# Patient Record
Sex: MALE | Race: OTHER | NOT HISPANIC OR LATINO
[De-identification: names, ages, dates, MRNs, and addresses within clinical notes are randomized per-mention and may not be internally consistent; named-entity substitution may affect disease eponyms.]

---

## 2017-12-11 ENCOUNTER — APPOINTMENT (OUTPATIENT)
Dept: UROLOGY | Facility: CLINIC | Age: 62
End: 2017-12-11
Payer: COMMERCIAL

## 2017-12-11 VITALS
HEART RATE: 78 BPM | SYSTOLIC BLOOD PRESSURE: 110 MMHG | HEIGHT: 67 IN | OXYGEN SATURATION: 99 % | BODY MASS INDEX: 22.91 KG/M2 | WEIGHT: 146 LBS | DIASTOLIC BLOOD PRESSURE: 72 MMHG

## 2017-12-11 DIAGNOSIS — E78.00 PURE HYPERCHOLESTEROLEMIA, UNSPECIFIED: ICD-10-CM

## 2017-12-11 DIAGNOSIS — Z86.39 PERSONAL HISTORY OF OTHER ENDOCRINE, NUTRITIONAL AND METABOLIC DISEASE: ICD-10-CM

## 2017-12-11 DIAGNOSIS — N40.0 BENIGN PROSTATIC HYPERPLASIA WITHOUT LOWER URINARY TRACT SYMPMS: ICD-10-CM

## 2017-12-11 LAB
BILIRUB UR QL STRIP: NORMAL
CLARITY UR: CLEAR
COLLECTION METHOD: NORMAL
GLUCOSE UR-MCNC: NORMAL
HCG UR QL: 0.2 EU/DL
HGB UR QL STRIP.AUTO: NORMAL
KETONES UR-MCNC: NORMAL
LEUKOCYTE ESTERASE UR QL STRIP: NORMAL
NITRITE UR QL STRIP: NORMAL
PH UR STRIP: 5
PROT UR STRIP-MCNC: NORMAL
SP GR UR STRIP: >=1.03

## 2017-12-11 PROCEDURE — 81003 URINALYSIS AUTO W/O SCOPE: CPT | Mod: QW

## 2017-12-11 PROCEDURE — 99214 OFFICE O/P EST MOD 30 MIN: CPT

## 2017-12-11 PROCEDURE — 51798 US URINE CAPACITY MEASURE: CPT

## 2018-01-16 ENCOUNTER — APPOINTMENT (OUTPATIENT)
Dept: ULTRASOUND IMAGING | Facility: CLINIC | Age: 63
End: 2018-01-16
Payer: COMMERCIAL

## 2018-01-16 ENCOUNTER — OUTPATIENT (OUTPATIENT)
Dept: OUTPATIENT SERVICES | Facility: HOSPITAL | Age: 63
LOS: 1 days | End: 2018-01-16
Payer: COMMERCIAL

## 2018-01-16 DIAGNOSIS — Z98.41 CATARACT EXTRACTION STATUS, RIGHT EYE: Chronic | ICD-10-CM

## 2018-01-16 DIAGNOSIS — Z00.8 ENCOUNTER FOR OTHER GENERAL EXAMINATION: ICD-10-CM

## 2018-01-16 PROCEDURE — 76775 US EXAM ABDO BACK WALL LIM: CPT | Mod: 26

## 2018-01-16 PROCEDURE — 76775 US EXAM ABDO BACK WALL LIM: CPT

## 2018-01-16 PROCEDURE — 76770 US EXAM ABDO BACK WALL COMP: CPT | Mod: 26

## 2018-01-16 PROCEDURE — 76770 US EXAM ABDO BACK WALL COMP: CPT

## 2018-01-22 ENCOUNTER — APPOINTMENT (OUTPATIENT)
Dept: UROLOGY | Facility: CLINIC | Age: 63
End: 2018-01-22
Payer: COMMERCIAL

## 2018-01-22 DIAGNOSIS — Z87.442 PERSONAL HISTORY OF URINARY CALCULI: ICD-10-CM

## 2018-01-22 DIAGNOSIS — R35.1 NOCTURIA: ICD-10-CM

## 2018-01-22 PROCEDURE — 51798 US URINE CAPACITY MEASURE: CPT

## 2018-01-22 PROCEDURE — 99213 OFFICE O/P EST LOW 20 MIN: CPT

## 2018-08-27 ENCOUNTER — APPOINTMENT (OUTPATIENT)
Dept: UROLOGY | Facility: CLINIC | Age: 63
End: 2018-08-27

## 2019-02-28 ENCOUNTER — APPOINTMENT (OUTPATIENT)
Dept: UROLOGY | Facility: CLINIC | Age: 64
End: 2019-02-28
Payer: COMMERCIAL

## 2019-02-28 VITALS
DIASTOLIC BLOOD PRESSURE: 72 MMHG | RESPIRATION RATE: 14 BRPM | HEART RATE: 93 BPM | WEIGHT: 150 LBS | BODY MASS INDEX: 23.54 KG/M2 | HEIGHT: 67 IN | SYSTOLIC BLOOD PRESSURE: 106 MMHG

## 2019-02-28 PROCEDURE — 99214 OFFICE O/P EST MOD 30 MIN: CPT | Mod: 25

## 2019-02-28 PROCEDURE — 51798 US URINE CAPACITY MEASURE: CPT

## 2019-02-28 NOTE — ASSESSMENT
[FreeTextEntry1] : Reviewed outside records. \par \par Benign Prostatic Hyperplasia:\par Continue Flomax and Finasteride.\par \par Erectile dysfunction\par Continue Cialis 5 mg daily.\par \par Renal cyst:\par Asked Pt to inform us when has CT scan. Will review and call him back.\par \par Return to office in 1 year or sooner if any issues- will do Uroflo and PVR.\par

## 2019-02-28 NOTE — HISTORY OF PRESENT ILLNESS
[FreeTextEntry1] : 62 yo male presents for follow up. \par Taking Flomax, Finasteride and Cialis 5 mg daily. \par Reports reasonable stream, urinates every few hours or so during the day depending on fluid: water, coffee/tea and soda intake. Nocturia of 2-3 x. Tried Oxybutynin in the past- dryness of mouth stopped.\par Denies dysuria, hematuria, fever, chills or rigors.\par Right sided back pain and had  Ultrasound with Primary care physician- Renal cyst. Waiting to get CT scan. Has history of Kidney stone in the past.\par Rates erections 4/5 with Cialis 5 mg daily. Normal libido.\par \par Initially seen for history of Benign Prostatic Hyperplasia. Patient from my previous practice.\par On Finasteride and Flomax, also has h/o ED on Cialis 5 mg daily and h/o Kidney stones. \par Reported variable stream, urinates every few hours or so during the day and nocturia of 2-4 x. \par Endorsed night time incomplete Emptying of Bladder and straining. \par \par Rates erections as 3/5. Reports normal libido. Takes Cialis 5 mg daily. \par \par No family history of Prostate cancer.\par \par Was told has Kidney stone on US , had CT scan- punctate, non obstructing Kidney stone. \par \par \par

## 2019-02-28 NOTE — LETTER BODY
[Dear  ___] : Dear  [unfilled], [Courtesy Letter:] : I had the pleasure of seeing your patient, [unfilled], in my office today. [Please see my note below.] : Please see my note below. [Sincerely,] : Sincerely, [FreeTextEntry3] : Aguila Bishop MD\par  of Urology\par [facility]\par \par Offices:\par [facility] @\par [address], [place] [address]\par and\par [address], [place] [address], [address]\par and\par [address] [address]\par \par TEL: [phone]\par FAX: [phone]

## 2019-02-28 NOTE — PHYSICAL EXAM
[General Appearance - Well Developed] : well developed [Normal Appearance] : normal appearance [General Appearance - In No Acute Distress] : no acute distress [Abdomen Soft] : soft [Abdomen Tenderness] : non-tender [Abdomen Mass (___ Cm)] : no abdominal mass palpated [Costovertebral Angle Tenderness] : no ~M costovertebral angle tenderness [Urethral Meatus] : meatus normal [Penis Abnormality] : normal uncircumcised penis [Scrotum] : the scrotum was normal [Epididymis] : the epididymides were normal [Testes Tenderness] : no tenderness of the testes [Testes Mass (___cm)] : there were no testicular masses [Skin Color & Pigmentation] : normal skin color and pigmentation [FreeTextEntry1] : normal peripheral circulation [] : no respiratory distress [Oriented To Time, Place, And Person] : oriented to person, place, and time [Normal Station and Gait] : the gait and station were normal for the patient's age [No Focal Deficits] : no focal deficits [No Palpable Adenopathy] : no palpable adenopathy

## 2020-01-02 ENCOUNTER — APPOINTMENT (OUTPATIENT)
Dept: UROLOGY | Facility: CLINIC | Age: 65
End: 2020-01-02
Payer: COMMERCIAL

## 2020-01-02 DIAGNOSIS — R82.81 PYURIA: ICD-10-CM

## 2020-01-02 DIAGNOSIS — R31.29 OTHER MICROSCOPIC HEMATURIA: ICD-10-CM

## 2020-01-02 LAB
BILIRUB UR QL STRIP: NORMAL
CLARITY UR: NORMAL
COLLECTION METHOD: NORMAL
GLUCOSE UR-MCNC: NORMAL
HCG UR QL: 0.2 EU/DL
HGB UR QL STRIP.AUTO: NORMAL
KETONES UR-MCNC: NORMAL
LEUKOCYTE ESTERASE UR QL STRIP: NORMAL
NITRITE UR QL STRIP: POSITIVE
PH UR STRIP: 5.5
PROT UR STRIP-MCNC: NORMAL
SP GR UR STRIP: 1.02

## 2020-01-02 PROCEDURE — 51798 US URINE CAPACITY MEASURE: CPT

## 2020-01-02 PROCEDURE — 99213 OFFICE O/P EST LOW 20 MIN: CPT | Mod: 25

## 2020-01-02 PROCEDURE — 81003 URINALYSIS AUTO W/O SCOPE: CPT | Mod: QW

## 2020-01-02 RX ORDER — DOXYCYCLINE HYCLATE 100 MG/1
100 CAPSULE ORAL
Qty: 30 | Refills: 0 | Status: ACTIVE | COMMUNITY
Start: 2019-12-13

## 2020-01-02 RX ORDER — AZITHROMYCIN 250 MG/1
250 TABLET, FILM COATED ORAL
Qty: 6 | Refills: 0 | Status: ACTIVE | COMMUNITY
Start: 2019-11-12

## 2020-01-02 RX ORDER — ATORVASTATIN CALCIUM 20 MG/1
20 TABLET, FILM COATED ORAL
Qty: 30 | Refills: 0 | Status: ACTIVE | COMMUNITY
Start: 2019-09-23

## 2020-01-02 NOTE — HISTORY OF PRESENT ILLNESS
[FreeTextEntry1] : The patient is a 64-year-old gentleman who is followed by Dr. Bishop for BPH who was seen in the office today for the above. The above symptoms started about one week ago. Last night he had nocturia every 5 minutes. The suprapubic pain is at 5/10 and he has been having difficulty initiating a stream and must strain to do so. He denies other urological and constitutional symptomatology including fever and chills.\par He said that he had the same symptoms in November of 2019 up with a fever of 101-102. He explained that his brother is a physician and he gave him Cipro for 2 weeks. His symptoms resolved and he was doing well until 2 weeks ago when the symptoms recurred.\par \par His past medical history, surgical history, medication history and allergy history are unchanged.

## 2020-01-02 NOTE — ASSESSMENT
[FreeTextEntry1] : #1) prostatitis with blood and pus on the dipstick urinalysis today.\par Since he had difficulty initiating his stream and was straining to do so, a PVR bladder sonogram was performed after he collected urine today for culture and urinalysis.\par The PVR was 136 mL.\par Urine sent for microscopic exam and culture.\par Levofloxacin 500 mg p.o. daily x3 weeks, avoid all alcohol, spicy foods and caffeine, and take a sitz bath daily.\par \par RTO 5 weeks to see Dr. Bishop

## 2020-01-02 NOTE — PHYSICAL EXAM
[General Appearance - Well Developed] : well developed [General Appearance - Well Nourished] : well nourished [Normal Appearance] : normal appearance [Well Groomed] : well groomed [General Appearance - In No Acute Distress] : no acute distress [Bowel Sounds] : normal bowel sounds [Abdomen Soft] : soft [Abdomen Tenderness] : non-tender [Abdomen Mass (___ Cm)] : no abdominal mass palpated [Costovertebral Angle Tenderness] : no ~M costovertebral angle tenderness [Anus Abnormality] : the anus and perineum were normal [Rectal Exam - Rectum] : digital rectal exam was normal [No Prostate Nodules] : no prostate nodules [Prostate Size ___ gm] : prostate size [unfilled] gm [] : no rash [Edema] : no peripheral edema [Oriented To Time, Place, And Person] : oriented to person, place, and time [Affect] : the affect was normal [Mood] : the mood was normal [Not Anxious] : not anxious [Normal Station and Gait] : the gait and station were normal for the patient's age [No Focal Deficits] : no focal deficits [FreeTextEntry1] : The prostate was diffusely tender at 8/10

## 2020-01-03 LAB
APPEARANCE: ABNORMAL
BACTERIA: ABNORMAL
BILIRUBIN URINE: NEGATIVE
BLOOD URINE: ABNORMAL
COLOR: ABNORMAL
GLUCOSE QUALITATIVE U: NEGATIVE
HYALINE CASTS: 0 /LPF
KETONES URINE: NEGATIVE
LEUKOCYTE ESTERASE URINE: ABNORMAL
MICROSCOPIC-UA: NORMAL
NITRITE URINE: NEGATIVE
PH URINE: 6
PROTEIN URINE: ABNORMAL
RED BLOOD CELLS URINE: 33 /HPF
SPECIFIC GRAVITY URINE: 1.01
SQUAMOUS EPITHELIAL CELLS: 1 /HPF
UROBILINOGEN URINE: NORMAL
WHITE BLOOD CELLS URINE: >720 /HPF

## 2020-01-08 ENCOUNTER — OTHER (OUTPATIENT)
Age: 65
End: 2020-01-08

## 2020-01-08 LAB — BACTERIA UR CULT: ABNORMAL

## 2020-02-06 ENCOUNTER — APPOINTMENT (OUTPATIENT)
Dept: UROLOGY | Facility: CLINIC | Age: 65
End: 2020-02-06
Payer: COMMERCIAL

## 2020-02-06 VITALS
BODY MASS INDEX: 23.86 KG/M2 | RESPIRATION RATE: 14 BRPM | DIASTOLIC BLOOD PRESSURE: 78 MMHG | HEIGHT: 67 IN | HEART RATE: 69 BPM | SYSTOLIC BLOOD PRESSURE: 116 MMHG | WEIGHT: 152 LBS

## 2020-02-06 DIAGNOSIS — N28.1 CYST OF KIDNEY, ACQUIRED: ICD-10-CM

## 2020-02-06 DIAGNOSIS — R97.20 ELEVATED PROSTATE, SPECIFIC ANTIGEN [PSA]: ICD-10-CM

## 2020-02-06 PROCEDURE — 81003 URINALYSIS AUTO W/O SCOPE: CPT | Mod: QW

## 2020-02-06 PROCEDURE — 51798 US URINE CAPACITY MEASURE: CPT

## 2020-02-06 PROCEDURE — 99214 OFFICE O/P EST MOD 30 MIN: CPT | Mod: 25

## 2020-02-06 RX ORDER — OXYBUTYNIN CHLORIDE 5 MG/1
5 TABLET ORAL
Qty: 30 | Refills: 5 | Status: DISCONTINUED | COMMUNITY
Start: 2017-12-11 | End: 2020-02-06

## 2020-02-18 PROBLEM — N28.1 ACQUIRED COMPLEX RENAL CYST: Status: ACTIVE | Noted: 2019-02-28

## 2020-02-18 NOTE — ASSESSMENT
[FreeTextEntry1] : Benign Prostatic Hyperplasia:\par Renal cyst:\par Rising PSA:\par Discussed Benign Prostatic Hyperplasia management. Will continue Flomax and Finasteride.\par Renal cyst: Adrenal cyst \par PSA went up, will repeat in March with Primary care physician. Asked to let us know when he does. \par \par Return to office in 1 year or sooner if any issues- will do Uroflo and PVR.

## 2020-02-18 NOTE — LETTER BODY
[Dear  ___] : Dear  [unfilled], [Courtesy Letter:] : I had the pleasure of seeing your patient, [unfilled], in my office today. [Please see my note below.] : Please see my note below. [Sincerely,] : Sincerely, [FreeTextEntry3] : Aguila Bishop MD\par  of Urology\par [facility]\par \par Offices:\par [facility] @\par [address], [place] [address]\par and\par [address], [place] [address], [address]\par and\par [address] [address]\par \par TEL: [phone]\par FAX: [phone]

## 2020-02-18 NOTE — HISTORY OF PRESENT ILLNESS
[FreeTextEntry1] : 65 yo male presents for follow up.\par Saw Dr Blanco last month and was treated for Prostatitis.\par Symptoms back to baseline. \par Taking Flomax, Finasteride and Cialis 5 mg daily. \par Reasonable stream, urinates every few hours or so during the day. Nocturia of 2-3 x. \par Denies dysuria, hematuria, fever, chills or rigors.\par Rates erections 4/5 with Cialis 5 mg daily. Normal libido.\par Tried Oxybutynin in the past- dryness of mouth stopped.\par \par Initially seen for history of Benign Prostatic Hyperplasia. Patient from my previous practice.\par On Finasteride and Flomax, also has h/o ED on Cialis 5 mg daily and h/o Kidney stones. \par Reported variable stream, urinates every few hours or so during the day and nocturia of 2-4 x. \par Endorsed night time incomplete Emptying of Bladder and straining. \par \par Rates erections as 3/5. Reports normal libido. Takes Cialis 5 mg daily. \par \par No family history of Prostate cancer.\par \par Was told has Kidney stone on US , had CT scan- punctate, non obstructing Kidney stone. \par \par \par

## 2020-03-05 ENCOUNTER — APPOINTMENT (OUTPATIENT)
Dept: UROLOGY | Facility: CLINIC | Age: 65
End: 2020-03-05

## 2021-02-11 ENCOUNTER — APPOINTMENT (OUTPATIENT)
Dept: UROLOGY | Facility: CLINIC | Age: 66
End: 2021-02-11

## 2021-02-15 ENCOUNTER — NON-APPOINTMENT (OUTPATIENT)
Age: 66
End: 2021-02-15

## 2021-02-16 ENCOUNTER — APPOINTMENT (OUTPATIENT)
Dept: UROLOGY | Facility: CLINIC | Age: 66
End: 2021-02-16
Payer: COMMERCIAL

## 2021-02-16 ENCOUNTER — APPOINTMENT (OUTPATIENT)
Dept: UROLOGY | Facility: CLINIC | Age: 66
End: 2021-02-16

## 2021-02-16 VITALS — HEART RATE: 81 BPM | SYSTOLIC BLOOD PRESSURE: 130 MMHG | TEMPERATURE: 97.2 F | DIASTOLIC BLOOD PRESSURE: 78 MMHG

## 2021-02-16 PROCEDURE — 99072 ADDL SUPL MATRL&STAF TM PHE: CPT

## 2021-02-16 PROCEDURE — 51798 US URINE CAPACITY MEASURE: CPT | Mod: 59

## 2021-02-16 PROCEDURE — 99214 OFFICE O/P EST MOD 30 MIN: CPT | Mod: 25

## 2021-02-16 PROCEDURE — 51741 ELECTRO-UROFLOWMETRY FIRST: CPT

## 2021-02-16 NOTE — ASSESSMENT
[FreeTextEntry1] : Reviewed outside records. \par PSA stable. \par \par Benign Prostatic Hyperplasia: \par Discussed treatment options mainly: continued medical management  Vs Surgery: Transurethral water vapor ablation/resection/vaporization of Prostate/Simple prostatectomy- open Vs robotic after appropriate work up.\par Will medical therapy for now. \par Continue Flomax and Finasteride. \par \par Erectile dysfunction:\par Discussed treatment options. \par Continue Tadalafil 5 mg daily. \par \par Return to office in 1 year or sooner if any issues- will do Uroflo/PVR \par

## 2021-02-16 NOTE — HISTORY OF PRESENT ILLNESS
[FreeTextEntry1] : 64 yo male presents for follow up.\par Taking Flomax, Finasteride and Cialis 5 mg daily. \par Reasonable stream, urinates every few hours or so during the day. Nocturia of 2-3 x. \par Denies dysuria, hematuria, lower abdominal or flank pain, nausea, vomiting, fever, chills or rigors. \par Rates erections 4/5 with Cialis 5 mg daily. Normal libido.\par \par Tried Oxybutynin in the past- dryness of mouth stopped.\par Saw Dr Blanco last year and was treated for Prostatitis.\par \par Initially seen for history of Benign Prostatic Hyperplasia. Patient from my previous practice.\par On Finasteride and Flomax, also has h/o ED on Cialis 5 mg daily and h/o Kidney stones. \par Reported variable stream, urinates every few hours or so during the day and nocturia of 2-4 x. \par Endorsed night time incomplete Emptying of Bladder and straining. \par \par Rates erections as 3/5. Reports normal libido. Takes Cialis 5 mg daily. \par \par No family history of Prostate cancer.\par \par Was told has Kidney stone on US , had CT scan- punctate, non obstructing Kidney stone. \par \par \par

## 2021-02-16 NOTE — PHYSICAL EXAM
[Urethral Meatus] : meatus normal [Penis Abnormality] : normal uncircumcised penis [Scrotum] : the scrotum was normal [Epididymis] : the epididymides were normal [Testes Tenderness] : no tenderness of the testes [Testes Mass (___cm)] : there were no testicular masses [Prostate Tenderness] : the prostate was not tender [No Prostate Nodules] : no prostate nodules [Prostate Size ___ (0-4)] : prostate size [unfilled] (scale: 0-4) [Normal Appearance] : normal appearance [General Appearance - In No Acute Distress] : no acute distress [Abdomen Soft] : soft [Abdomen Tenderness] : non-tender [Costovertebral Angle Tenderness] : no ~M costovertebral angle tenderness [Skin Color & Pigmentation] : normal skin color and pigmentation [FreeTextEntry1] : normal peripheral circulation [] : no respiratory distress [Oriented To Time, Place, And Person] : oriented to person, place, and time [Normal Station and Gait] : the gait and station were normal for the patient's age [No Palpable Adenopathy] : no palpable adenopathy

## 2021-02-16 NOTE — LETTER BODY
[Dear  ___] : Dear  [unfilled], [Courtesy Letter:] : I had the pleasure of seeing your patient, [unfilled], in my office today. [Please see my note below.] : Please see my note below. [Sincerely,] : Sincerely, [FreeTextEntry3] : Aguila Bishop MD\par  of Urology\par [facility]\par \par Offices:\par [facility] @\par [address], [place] [address]\par and\par [address], [place] [address], [address]\par and\par [address] [address]\par \par TEL: [phone]\par FAX: [phone]

## 2022-02-15 ENCOUNTER — APPOINTMENT (OUTPATIENT)
Dept: UROLOGY | Facility: CLINIC | Age: 67
End: 2022-02-15
Payer: MEDICARE

## 2022-02-15 VITALS — HEART RATE: 67 BPM | DIASTOLIC BLOOD PRESSURE: 77 MMHG | TEMPERATURE: 98.1 F | SYSTOLIC BLOOD PRESSURE: 125 MMHG

## 2022-02-15 PROCEDURE — 51798 US URINE CAPACITY MEASURE: CPT

## 2022-02-15 PROCEDURE — 51741 ELECTRO-UROFLOWMETRY FIRST: CPT

## 2022-02-15 PROCEDURE — 99214 OFFICE O/P EST MOD 30 MIN: CPT | Mod: 25

## 2022-02-27 NOTE — ASSESSMENT
[FreeTextEntry1] : Reviewed outside records on [facility]. \par Labs: 1/7/22\par PSA: 0.84\par Creatinine: 1.03\par \par Benign Prostatic Hyperplasia:\par See Uroflo and PVR. \par Discussed treatment options. will continue Flomax and Finasteride. \par \par PSA Screening:\par Discussed considering Patient on Finasteride, corrected PSA: 1.68.\par PSA stable. \par \par Erectile dysfunction:\par Discussed treatment options. Will continue Tadalafil. \par \par Return to office in 1 year or sooner if any issues: will do Uroflo/PVR.

## 2022-02-27 NOTE — LETTER BODY
[Dear  ___] : Dear  [unfilled], [Courtesy Letter:] : I had the pleasure of seeing your patient, [unfilled], in my office today. [Please see my note below.] : Please see my note below. [Sincerely,] : Sincerely, [FreeTextEntry3] : Aguila Bishop MD\par  of Urology\par [facility]\par \par Offices:\par [facility] @\par [address], [place] [address]\par and\par [address], [place] [address], [address]\par and\par [address] [address]\par \par TEL: [phone]\par FAX: [phone]

## 2022-02-27 NOTE — HISTORY OF PRESENT ILLNESS
[FreeTextEntry1] : 67 yo male presents for follow up.\par Taking Flomax, Finasteride and Cialis 5 mg daily. \par Has reasonable stream, urinates every 2-4 hours or so during the day. Nocturia of 1-2 x. \par Denies dysuria, hematuria, lower abdominal or flank pain, nausea, vomiting, fever, chills or rigors. \par Rates erections 4/5 with Cialis 5 mg daily. Normal libido.\par \par Tried Oxybutynin in the past- dryness of mouth stopped.\par Saw Dr Blanco in January 20202 and was treated for Prostatitis.\par \par Initially seen for history of Benign Prostatic Hyperplasia. Patient from my previous practice.\par On Finasteride and Flomax, also has h/o ED on Cialis 5 mg daily and h/o Kidney stones. \par Reported variable stream, urinates every few hours or so during the day and nocturia of 2-4 x. \par Endorsed night time incomplete Emptying of Bladder and straining. \par \par Rates erections as 3/5. Reports normal libido. Takes Cialis 5 mg daily. \par \par No family history of Prostate cancer.\par \par Was told has Kidney stone on US , had CT scan- punctate, non obstructing Kidney stone. \par \par \par

## 2022-02-27 NOTE — PHYSICAL EXAM
[Urethral Meatus] : meatus normal [Penis Abnormality] : normal uncircumcised penis [Scrotum] : the scrotum was normal [Epididymis] : the epididymides were normal [Testes Tenderness] : no tenderness of the testes [Testes Mass (___cm)] : there were no testicular masses [Prostate Tenderness] : the prostate was not tender [No Prostate Nodules] : no prostate nodules [Prostate Size ___ (0-4)] : prostate size [unfilled] (scale: 0-4) [Normal Appearance] : normal appearance [General Appearance - In No Acute Distress] : no acute distress [Abdomen Tenderness] : non-tender [Abdomen Soft] : soft [Skin Color & Pigmentation] : normal skin color and pigmentation [] : no respiratory distress [Oriented To Time, Place, And Person] : oriented to person, place, and time [Normal Station and Gait] : the gait and station were normal for the patient's age [FreeTextEntry1] : normal peripheral circulation

## 2022-07-18 ENCOUNTER — NON-APPOINTMENT (OUTPATIENT)
Age: 67
End: 2022-07-18

## 2023-01-20 ENCOUNTER — RX RENEWAL (OUTPATIENT)
Age: 68
End: 2023-01-20

## 2023-02-09 ENCOUNTER — NON-APPOINTMENT (OUTPATIENT)
Age: 68
End: 2023-02-09

## 2023-02-09 DIAGNOSIS — N41.9 INFLAMMATORY DISEASE OF PROSTATE, UNSPECIFIED: ICD-10-CM

## 2023-02-09 RX ORDER — CIPROFLOXACIN HYDROCHLORIDE 500 MG/1
500 TABLET, FILM COATED ORAL TWICE DAILY
Qty: 28 | Refills: 0 | Status: ACTIVE | COMMUNITY
Start: 2019-10-14 | End: 1900-01-01

## 2023-02-09 RX ORDER — LEVOFLOXACIN 500 MG/1
500 TABLET, FILM COATED ORAL DAILY
Qty: 21 | Refills: 0 | Status: COMPLETED | COMMUNITY
Start: 2020-01-02 | End: 2023-02-09

## 2023-02-09 RX ORDER — CEFUROXIME AXETIL 500 MG/1
500 TABLET ORAL
Qty: 14 | Refills: 0 | Status: COMPLETED | COMMUNITY
Start: 2022-07-19 | End: 2023-02-09

## 2023-02-28 ENCOUNTER — APPOINTMENT (OUTPATIENT)
Dept: UROLOGY | Facility: CLINIC | Age: 68
End: 2023-02-28

## 2023-05-23 ENCOUNTER — APPOINTMENT (OUTPATIENT)
Dept: UROLOGY | Facility: CLINIC | Age: 68
End: 2023-05-23
Payer: MEDICARE

## 2023-05-23 ENCOUNTER — TRANSCRIPTION ENCOUNTER (OUTPATIENT)
Age: 68
End: 2023-05-23

## 2023-05-23 VITALS
BODY MASS INDEX: 24.33 KG/M2 | HEIGHT: 67 IN | WEIGHT: 155 LBS | DIASTOLIC BLOOD PRESSURE: 83 MMHG | OXYGEN SATURATION: 100 % | RESPIRATION RATE: 18 BRPM | HEART RATE: 69 BPM | SYSTOLIC BLOOD PRESSURE: 133 MMHG

## 2023-05-23 DIAGNOSIS — N52.9 MALE ERECTILE DYSFUNCTION, UNSPECIFIED: ICD-10-CM

## 2023-05-23 DIAGNOSIS — Z12.5 ENCOUNTER FOR SCREENING FOR MALIGNANT NEOPLASM OF PROSTATE: ICD-10-CM

## 2023-05-23 DIAGNOSIS — N40.1 BENIGN PROSTATIC HYPERPLASIA WITH LOWER URINARY TRACT SYMPMS: ICD-10-CM

## 2023-05-23 DIAGNOSIS — N13.8 BENIGN PROSTATIC HYPERPLASIA WITH LOWER URINARY TRACT SYMPMS: ICD-10-CM

## 2023-05-23 PROCEDURE — 99214 OFFICE O/P EST MOD 30 MIN: CPT

## 2023-05-23 RX ORDER — FINASTERIDE 5 MG/1
5 TABLET, FILM COATED ORAL DAILY
Qty: 90 | Refills: 3 | Status: ACTIVE | COMMUNITY
Start: 2020-02-06 | End: 1900-01-01

## 2023-05-23 NOTE — HISTORY OF PRESENT ILLNESS
[FreeTextEntry1] : 68 yo male presents for follow up.\par In February had Prostatitis. Was in [place]. \par Taking Flomax, Finasteride and Cialis 5 mg daily. \par Has reasonable stream, urinates every 2-4 hours or so during the day. Nocturia of 1-2 x. \par Denies dysuria, hematuria, lower abdominal or flank pain, nausea, vomiting, fever, chills or rigors. \par Rates erections 4/5 with Tadalafil. Normal libido.\par \par Tried Oxybutynin in the past- dryness of mouth stopped.\par Saw Dr Blanco in January 20202 and was treated for Prostatitis.\par \par Initially seen for history of Benign Prostatic Hyperplasia. Patient from my previous practice.\par On Finasteride and Flomax, also has h/o ED on Cialis 5 mg daily and h/o Kidney stones. \par Reported variable stream, urinates every few hours or so during the day and nocturia of 2-4 x. \par Endorsed night time incomplete Emptying of Bladder and straining. \par \par Rates erections as 3/5. Reports normal libido. Takes Cialis 5 mg daily. \par \par No family history of Prostate cancer.\par \par Was told has Kidney stone on US , had CT scan- punctate, non obstructing Kidney stone. \par \par \par

## 2023-05-23 NOTE — PHYSICAL EXAM
[Urethral Meatus] : meatus normal [Penis Abnormality] : normal uncircumcised penis [Scrotum] : the scrotum was normal [Epididymis] : the epididymides were normal [Testes Tenderness] : no tenderness of the testes [Testes Mass (___cm)] : there were no testicular masses [Prostate Tenderness] : the prostate was not tender [No Prostate Nodules] : no prostate nodules [Prostate Size ___ (0-4)] : prostate size [unfilled] (scale: 0-4) [Normal Appearance] : normal appearance [General Appearance - In No Acute Distress] : no acute distress [Abdomen Soft] : soft [Abdomen Tenderness] : non-tender [] : no respiratory distress [Oriented To Time, Place, And Person] : oriented to person, place, and time [Normal Station and Gait] : the gait and station were normal for the patient's age [FreeTextEntry1] : normal peripheral circulation

## 2023-05-23 NOTE — ASSESSMENT
[FreeTextEntry1] : Reviewed records including [facility]. \par Discussed labs and imaging. \par No new PSA. \par \par Benign Prostatic Hyperplasia:\par Bladder scan: 176 ml, had urinated little while ago. \par PVR: 110 ml. \par Will get Urinalysis with reflex Urine culture.\par Discussed treatment options, will continue Flomax and Finasteride. \par \par Erectile dysfunction:\par Continue Tadalafil 5 mg daily. \par \par PSA Screening:\par Continue PSA Screening. Will do with PCP. \par \par Return to office in 1 year or sooner if any issues: will do Uroflo/PVR.

## 2023-05-23 NOTE — LETTER BODY
[Dear  ___] : Dear  [unfilled], [Courtesy Letter:] : I had the pleasure of seeing your patient, [unfilled], in my office today. [Please see my note below.] : Please see my note below. [Sincerely,] : Sincerely, [FreeTextEntry3] : Aguila Bishop MD\par  of Urology\par [facility]\par \par [facility]\par Offices:\par [address] [facility]\par [address] [address]\par [address], [address]\par \par TEL: [phone]\par FAX: [phone]

## 2023-05-26 LAB
APPEARANCE: CLEAR
BILIRUBIN URINE: NEGATIVE
BLOOD URINE: NEGATIVE
COLOR: YELLOW
GLUCOSE QUALITATIVE U: NEGATIVE MG/DL
KETONES URINE: NEGATIVE MG/DL
LEUKOCYTE ESTERASE URINE: NEGATIVE
NITRITE URINE: NEGATIVE
PH URINE: 6.5
PROTEIN URINE: NEGATIVE MG/DL
SPECIFIC GRAVITY URINE: 1.01
UROBILINOGEN URINE: 0.2 MG/DL

## 2024-04-05 RX ORDER — TAMSULOSIN HYDROCHLORIDE 0.4 MG/1
0.4 CAPSULE ORAL
Qty: 90 | Refills: 1 | Status: ACTIVE | COMMUNITY
Start: 2020-02-06 | End: 1900-01-01

## 2024-05-21 ENCOUNTER — APPOINTMENT (OUTPATIENT)
Dept: UROLOGY | Facility: CLINIC | Age: 69
End: 2024-05-21

## 2024-06-10 RX ORDER — TADALAFIL 5 MG/1
5 TABLET ORAL
Qty: 90 | Refills: 0 | Status: ACTIVE | COMMUNITY
Start: 2021-02-16 | End: 1900-01-01

## 2024-08-20 ENCOUNTER — APPOINTMENT (OUTPATIENT)
Dept: UROLOGY | Facility: CLINIC | Age: 69
End: 2024-08-20
Payer: MEDICARE

## 2024-08-20 VITALS
BODY MASS INDEX: 26.16 KG/M2 | HEART RATE: 78 BPM | SYSTOLIC BLOOD PRESSURE: 138 MMHG | WEIGHT: 167 LBS | DIASTOLIC BLOOD PRESSURE: 97 MMHG

## 2024-08-20 DIAGNOSIS — Z12.5 ENCOUNTER FOR SCREENING FOR MALIGNANT NEOPLASM OF PROSTATE: ICD-10-CM

## 2024-08-20 DIAGNOSIS — N13.8 BENIGN PROSTATIC HYPERPLASIA WITH LOWER URINARY TRACT SYMPMS: ICD-10-CM

## 2024-08-20 DIAGNOSIS — N52.9 MALE ERECTILE DYSFUNCTION, UNSPECIFIED: ICD-10-CM

## 2024-08-20 DIAGNOSIS — N40.1 BENIGN PROSTATIC HYPERPLASIA WITH LOWER URINARY TRACT SYMPMS: ICD-10-CM

## 2024-08-20 PROCEDURE — 99214 OFFICE O/P EST MOD 30 MIN: CPT

## 2024-08-27 PROBLEM — N52.9 ORGANIC ERECTILE DYSFUNCTION: Noted: 2017-12-11

## 2024-08-27 PROBLEM — N52.9 MALE ERECTILE DISORDER: Status: ACTIVE | Noted: 2024-08-27

## 2024-08-27 NOTE — LETTER BODY
[Dear  ___] : Dear  [unfilled], [Courtesy Letter:] : I had the pleasure of seeing your patient, [unfilled], in my office today. [Please see my note below.] : Please see my note below. [Sincerely,] : Sincerely, [FreeTextEntry3] : Aguila Bishop MD\par   of Urology\par  [facility]\par  \par  [facility]\par  Offices:\par  [address] [facility]\par  [address] [address]\par  [address], [address]\par  \par  TEL: [phone]\par  FAX: [phone]

## 2024-08-27 NOTE — PHYSICAL EXAM
[Normal Appearance] : normal appearance [General Appearance - In No Acute Distress] : no acute distress [Abdomen Soft] : soft [Abdomen Tenderness] : non-tender [Urethral Meatus] : meatus normal [Penis Abnormality] : normal uncircumcised penis [Scrotum] : the scrotum was normal [Epididymis] : the epididymides were normal [Testes Tenderness] : no tenderness of the testes [Testes Mass (___cm)] : there were no testicular masses [Prostate Tenderness] : the prostate was not tender [No Prostate Nodules] : no prostate nodules [Prostate Size ___ (0-4)] : prostate size [unfilled] (scale: 0-4) [] : no respiratory distress [Oriented To Time, Place, And Person] : oriented to person, place, and time [Normal Station and Gait] : the gait and station were normal for the patient's age [FreeTextEntry1] : normal peripheral circulation

## 2024-08-27 NOTE — ASSESSMENT
[FreeTextEntry1] : Reviewed records. Discussed labs and imaging.   No new PSA.   Benign Prostatic Hyperplasia: Bladder scan: 176 ml, had urinated little while ago.  Discussed treatment options, will continue Flomax and Finasteride.   Erectile dysfunction: Continue Tadalafil 5 mg daily.   PSA Screening: Continue PSA Screening. Will do with PCP.  Mentioned as per comprehensive review and meta-analysis digital rectal exam exhibited a notably low diagnostic value for prostate cancer detection. With suggestion that it could be potentially omitted. Patient was agreeable with not doing digital rectal examination.   Return to office in 1 year or sooner if any issues: will do uroflow and check post void residual.

## 2024-08-27 NOTE — END OF VISIT
[Time Spent: ___ minutes] : I have spent [unfilled] minutes of time on the encounter. [FreeTextEntry3] : This note was generated by using [employer] and Dragon medical dictation software.  A reasonable effort was made to proofread and correct its contents, but typos and mistakes may still remain. If there are any questions or points of clarification needed, please contact my office.   Prior to appointment and during encounter with patient extensive medical records were reviewed including but not limited to, hospital records, outpatient records, imaging results and lab data if available. During this appointment the patient was examined, diagnoses were discussed and explained in a face-to-face manner. In addition, extensive time was spent reviewing aforementioned diagnostic studies. Counseling including test results, differential diagnoses, treatment options, risk and benefits, lifestyle changes, current condition, and current medications was performed. Patient's questions and concerns were addressed. Patient verbalized understanding of the treatment plan. Time spent is for reviewing chart, labs and images if available, counseling and care coordination.

## 2024-08-27 NOTE — HISTORY OF PRESENT ILLNESS
[FreeTextEntry1] : Primary care physician: Dr Tahira Doll, [facility].  69-year-old male presents for follow-up.  Taking Flomax, Finasteride, and Tadalafil 5 mg daily. No change in urination or erections.  Denies dysuria, hematuria, lower abdominal or flank pain, nausea, vomiting, fever, chills or rigors.  No new episode of prostatitis.  Did have labs with PCP.  On 8-13-24: creatinine: 0.87  PSA: 0.93, was 0.71 on 5-26-23. Discussed considering he is on Finasteride corrected PSA would be 1.86.   In the past: On Flomax, Finasteride and Tadalafil 5 mg daily.  Had reasonable stream, urinated every 2-4 hours or so during the day and nocturia of 1-2 x.  Denied hesitancy, straining, intermittency, urgency, incontinence or sense of incomplete emptying.  Rated erections 4/5 with Tadalafil. Normal libido. Tried Oxybutynin, had dryness of mouth so stopped. Saw Dr Blanco in January 20202 and was treated for Prostatitis.  Initially seen for history of Benign Prostatic Hyperplasia. Patient from my previous practice. On Finasteride and Flomax, also has h/o ED on Cialis 5 mg daily and h/o Kidney stones.  Reported variable stream, urinates every few hours or so during the day and nocturia of 2-4 x.  Endorsed nighttime incomplete Emptying of Bladder and straining.  Rated erections as 3/5. Reports normal libido. Takes Cialis 5 mg daily.  No family history of Prostate cancer.  Was told has Kidney stone on US, had CT scan- punctate, non-obstructing Kidney stone.

## 2024-08-27 NOTE — HISTORY OF PRESENT ILLNESS
[FreeTextEntry1] : Primary care physician: Dr Tahira Doll, [facility].  69-year-old male presents for follow-up.  Taking Flomax, Finasteride, and Tadalafil 5 mg daily. No change in urination or erections.  Denies dysuria, hematuria, lower abdominal or flank pain, nausea, vomiting, fever, chills or rigors.  No new episode of prostatitis.  Did have labs with PCP.  On 8-13-24: creatinine: 0.87  PSA: 0.93, was 0.71 on 5-26-23. Discussed considering he is on Finasteride corrected PSA would be 1.86.   In the past: On Flomax, Finasteride and Tadalafil 5 mg daily.  Had reasonable stream, urinated every 2-4 hours or so during the day and nocturia of 1-2 x.  Denied hesitancy, straining, intermittency, urgency, incontinence or sense of incomplete emptying.  Rated erections 4/5 with Tadalafil. Normal libido. Tried Oxybutynin, had dryness of mouth so stopped. Saw Dr Blanco in January 20202 and was treated for Prostatitis.  Initially seen for history of Benign Prostatic Hyperplasia. Patient from my previous practice. On Finasteride and Flomax, also has h/o ED on Cialis 5 mg daily and h/o Kidney stones.  Reported variable stream, urinates every few hours or so during the day and nocturia of 2-4 x.  Endorsed nighttime incomplete Emptying of Bladder and straining.  Rated erections as 3/5. Reports normal libido. Takes Cialis 5 mg daily.  No family history of Prostate cancer.  Was told has Kidney stone on US, had CT scan- punctate, non-obstructing Kidney stone.

## 2024-08-27 NOTE — LETTER BODY
[Dear  ___] : Dear  [unfilled], [Courtesy Letter:] : I had the pleasure of seeing your patient, [unfilled], in my office today. [Please see my note below.] : Please see my note below. [Sincerely,] : Sincerely, [FreeTextEntry3] : Aguila Bishop MD\par   of Urology\par  [facility]\par  \par  [facility]\par  Offices:\par  [address] [facility]", [facility]\par  [address] [address]\par  [address] [facility], [address]\par  \par  TEL: [phone]\par  FAX: [phone]

## 2024-08-27 NOTE — END OF VISIT
[Time Spent: ___ minutes] : I have spent [unfilled] minutes of time on the encounter. [FreeTextEntry3] : This note was generated by using [employer] and Dragon medical dictation software.  A reasonable effort was made to proofread and correct its contents, but typos and mistakes may still remain. If there are any questions or points of clarification needed, please contact my office.   Prior to appointment and during encounter with patient extensive medical records were reviewed including but not limited to, hospital records, outpatient records, imaging results and lab data if available. During this appointment the patient was examined, diagnoses were discussed and explained in a face-to-face manner. In addition, extensive time was spent reviewing aforementioned diagnostic studies. Counseling including test results, differential diagnoses, treatment options, risk and benefits, lifestyle changes, current condition, and current medications was performed. Patient's questions and concerns were addressed. Patient verbalized understanding of the treatment plan. Time spent is for reviewing chart, labs and images if available, counseling and care coordination.

## 2024-08-27 NOTE — END OF VISIT
[Time Spent: ___ minutes] : I have spent [unfilled] minutes of time on the encounter. [FreeTextEntry3] : This note was generated by using [employer] and Dragon medical dictation software.  A reasonable effort was made to proofread and correct its contents, but typos and mistakes may still remain. If there are any questions or points of clarification needed, please contact my office.   Prior to appointment and during encounter with patient extensive medical records were reviewed including but not limited to, hospital records, outpatient records, imaging results and lab data if available. During this appointment the patient was examined, diagnoses were discussed and explained in a face-to-face manner. In addition, extensive time was spent reviewing aforementioned diagnostic studies. Counseling including test results, differential diagnoses, treatment options, risk and benefits, lifestyle changes, current condition, and current medications was performed. Patient's questions and concerns were addressed. Patient verbalized understanding of the treatment plan. Time spent is for reviewing chart, labs and images if available, counseling and care coordination.

## 2024-08-27 NOTE — ADDENDUM
[FreeTextEntry1] :  Documented by Maryse Carballo acting as a scribe under Dr. Aguila Bishop. 08/20/2024

## 2024-08-27 NOTE — HISTORY OF PRESENT ILLNESS
[FreeTextEntry1] : Primary care physician: Dr Tahira Doll, [facility].  69-year-old male presents for follow-up.  Taking Flomax, Finasteride, and Tadalafil 5 mg daily. No change in urination or erections.  Denies dysuria, hematuria, lower abdominal or flank pain, nausea, vomiting, fever, chills or rigors.  No new episode of prostatitis.  Did have labs with PCP.  On 8-13-24: creatinine: 0.87  PSA: 0.93, was 0.71 on 5-26-23. Discussed considering he is on Finasteride corrected PSA would be 1.86.   In the past: On Flomax, Finasteride and Tadalafil 5 mg daily.  Had reasonable stream, urinated every 2-4 hours or so during the day and nocturia of 1-2 x.  Denied hesitancy, straining, intermittency, urgency, incontinence or sense of incomplete emptying.  Rated erections 4/5 with Tadalafil. Normal libido. Tried Oxybutynin, had dryness of mouth so stopped. Saw Dr Blanco in January 20202 and was treated for Prostatitis.  Initially seen for history of Benign Prostatic Hyperplasia. Patient from my previous practice. On Finasteride and Flomax, also has h/o ED on Cialis 5 mg daily and h/o Kidney stones.  Reported variable stream, urinates every few hours or so during the day and nocturia of 2-4 x.  Endorsed nighttime incomplete Emptying of Bladder and straining.  Rated erections as 3/5. Reports normal libido. Takes Cialis 5 mg daily.  No family history of Prostate cancer.  Was told has Kidney stone on US, had CT scan- punctate, non-obstructing Kidney stone.

## 2024-08-27 NOTE — LETTER BODY
[Dear  ___] : Dear  [unfilled], [Courtesy Letter:] : I had the pleasure of seeing your patient, [unfilled], in my office today. [Please see my note below.] : Please see my note below. [Sincerely,] : Sincerely, [FreeTextEntry3] : Aguila Bishop MD\par   of Urology\par  [facility]\par  \par  [facility]\par  Offices:\par  [address] [facility]\par  [address] [address]\par  [address] [facility], [address]\par  \par  TEL: [phone]\par  FAX: [phone]

## 2025-08-28 ENCOUNTER — RX RENEWAL (OUTPATIENT)
Age: 70
End: 2025-08-28